# Patient Record
Sex: MALE | Race: BLACK OR AFRICAN AMERICAN | Employment: FULL TIME | URBAN - METROPOLITAN AREA
[De-identification: names, ages, dates, MRNs, and addresses within clinical notes are randomized per-mention and may not be internally consistent; named-entity substitution may affect disease eponyms.]

---

## 2022-12-11 ENCOUNTER — APPOINTMENT (OUTPATIENT)
Dept: GENERAL RADIOLOGY | Age: 42
End: 2022-12-11
Payer: COMMERCIAL

## 2022-12-11 ENCOUNTER — HOSPITAL ENCOUNTER (EMERGENCY)
Age: 42
Discharge: HOME | End: 2022-12-11
Attending: EMERGENCY MEDICINE
Payer: COMMERCIAL

## 2022-12-11 VITALS
DIASTOLIC BLOOD PRESSURE: 108 MMHG | OXYGEN SATURATION: 98 % | WEIGHT: 250 LBS | TEMPERATURE: 97.5 F | HEIGHT: 74 IN | SYSTOLIC BLOOD PRESSURE: 156 MMHG | HEART RATE: 107 BPM | RESPIRATION RATE: 11 BRPM | BODY MASS INDEX: 32.08 KG/M2

## 2022-12-11 DIAGNOSIS — I47.1 PAROXYSMAL SUPRAVENTRICULAR TACHYCARDIA (HCC): Primary | ICD-10-CM

## 2022-12-11 LAB
ABSOLUTE EOS #: 0.09 K/UL (ref 0–0.44)
ABSOLUTE IMMATURE GRANULOCYTE: 0.05 K/UL (ref 0–0.3)
ABSOLUTE LYMPH #: 3.98 K/UL (ref 1.1–3.7)
ABSOLUTE MONO #: 0.95 K/UL (ref 0.1–1.2)
ANION GAP SERPL CALCULATED.3IONS-SCNC: 11 MMOL/L (ref 9–17)
BASOPHILS # BLD: 1 % (ref 0–2)
BASOPHILS ABSOLUTE: 0.05 K/UL (ref 0–0.2)
BUN BLDV-MCNC: 11 MG/DL (ref 6–20)
BUN/CREAT BLD: 12 (ref 9–20)
CALCIUM SERPL-MCNC: 9 MG/DL (ref 8.6–10.4)
CHLORIDE BLD-SCNC: 99 MMOL/L (ref 98–107)
CO2: 27 MMOL/L (ref 20–31)
CREAT SERPL-MCNC: 0.89 MG/DL (ref 0.7–1.2)
D-DIMER QUANTITATIVE: <0.27 MG/L FEU (ref 0–0.59)
EOSINOPHILS RELATIVE PERCENT: 1 % (ref 1–4)
GFR SERPL CREATININE-BSD FRML MDRD: >60 ML/MIN/1.73M2
GLUCOSE BLD-MCNC: 173 MG/DL (ref 70–99)
HCT VFR BLD CALC: 42.8 % (ref 40.7–50.3)
HEMOGLOBIN: 13.5 G/DL (ref 13–17)
IMMATURE GRANULOCYTES: 1 %
LYMPHOCYTES # BLD: 43 % (ref 24–43)
MAGNESIUM: 1.7 MG/DL (ref 1.6–2.6)
MCH RBC QN AUTO: 26.7 PG (ref 25.2–33.5)
MCHC RBC AUTO-ENTMCNC: 31.5 G/DL (ref 28.4–34.8)
MCV RBC AUTO: 84.8 FL (ref 82.6–102.9)
MONOCYTES # BLD: 10 % (ref 3–12)
NRBC AUTOMATED: 0 PER 100 WBC
PDW BLD-RTO: 13.4 % (ref 11.8–14.4)
PLATELET # BLD: 326 K/UL (ref 138–453)
PMV BLD AUTO: 10.5 FL (ref 8.1–13.5)
POTASSIUM SERPL-SCNC: 4.1 MMOL/L (ref 3.7–5.3)
PRO-BNP: 109 PG/ML
RBC # BLD: 5.05 M/UL (ref 4.21–5.77)
SEG NEUTROPHILS: 44 % (ref 36–65)
SEGMENTED NEUTROPHILS ABSOLUTE COUNT: 3.98 K/UL (ref 1.5–8.1)
SODIUM BLD-SCNC: 137 MMOL/L (ref 135–144)
TROPONIN, HIGH SENSITIVITY: 14 NG/L (ref 0–22)
TROPONIN, HIGH SENSITIVITY: 28 NG/L (ref 0–22)
TSH SERPL DL<=0.05 MIU/L-ACNC: 1.35 UIU/ML (ref 0.3–5)
WBC # BLD: 9.1 K/UL (ref 3.5–11.3)

## 2022-12-11 PROCEDURE — 85025 COMPLETE CBC W/AUTO DIFF WBC: CPT

## 2022-12-11 PROCEDURE — 96361 HYDRATE IV INFUSION ADD-ON: CPT

## 2022-12-11 PROCEDURE — 93005 ELECTROCARDIOGRAM TRACING: CPT | Performed by: NURSE PRACTITIONER

## 2022-12-11 PROCEDURE — 6360000002 HC RX W HCPCS: Performed by: NURSE PRACTITIONER

## 2022-12-11 PROCEDURE — 84443 ASSAY THYROID STIM HORMONE: CPT

## 2022-12-11 PROCEDURE — 71045 X-RAY EXAM CHEST 1 VIEW: CPT

## 2022-12-11 PROCEDURE — 2580000003 HC RX 258: Performed by: NURSE PRACTITIONER

## 2022-12-11 PROCEDURE — 83880 ASSAY OF NATRIURETIC PEPTIDE: CPT

## 2022-12-11 PROCEDURE — 2500000003 HC RX 250 WO HCPCS: Performed by: EMERGENCY MEDICINE

## 2022-12-11 PROCEDURE — 6370000000 HC RX 637 (ALT 250 FOR IP): Performed by: NURSE PRACTITIONER

## 2022-12-11 PROCEDURE — 83735 ASSAY OF MAGNESIUM: CPT

## 2022-12-11 PROCEDURE — 99285 EMERGENCY DEPT VISIT HI MDM: CPT

## 2022-12-11 PROCEDURE — 84484 ASSAY OF TROPONIN QUANT: CPT

## 2022-12-11 PROCEDURE — 36415 COLL VENOUS BLD VENIPUNCTURE: CPT

## 2022-12-11 PROCEDURE — 96375 TX/PRO/DX INJ NEW DRUG ADDON: CPT

## 2022-12-11 PROCEDURE — 85379 FIBRIN DEGRADATION QUANT: CPT

## 2022-12-11 PROCEDURE — 80048 BASIC METABOLIC PNL TOTAL CA: CPT

## 2022-12-11 PROCEDURE — 96374 THER/PROPH/DIAG INJ IV PUSH: CPT

## 2022-12-11 RX ORDER — LANOLIN ALCOHOL/MO/W.PET/CERES
400 CREAM (GRAM) TOPICAL ONCE
Status: COMPLETED | OUTPATIENT
Start: 2022-12-11 | End: 2022-12-11

## 2022-12-11 RX ORDER — ADENOSINE 3 MG/ML
12 INJECTION, SOLUTION INTRAVENOUS ONCE
Status: COMPLETED | OUTPATIENT
Start: 2022-12-11 | End: 2022-12-11

## 2022-12-11 RX ORDER — 0.9 % SODIUM CHLORIDE 0.9 %
1000 INTRAVENOUS SOLUTION INTRAVENOUS ONCE
Status: COMPLETED | OUTPATIENT
Start: 2022-12-11 | End: 2022-12-11

## 2022-12-11 RX ORDER — ADENOSINE 3 MG/ML
6 INJECTION, SOLUTION INTRAVENOUS ONCE
Status: COMPLETED | OUTPATIENT
Start: 2022-12-11 | End: 2022-12-11

## 2022-12-11 RX ORDER — METOPROLOL TARTRATE 5 MG/5ML
5 INJECTION INTRAVENOUS ONCE
Status: COMPLETED | OUTPATIENT
Start: 2022-12-11 | End: 2022-12-11

## 2022-12-11 RX ORDER — ADENOSINE 3 MG/ML
12 INJECTION, SOLUTION INTRAVENOUS ONCE
Status: DISCONTINUED | OUTPATIENT
Start: 2022-12-11 | End: 2022-12-11

## 2022-12-11 RX ORDER — METOPROLOL SUCCINATE 50 MG/1
50 TABLET, EXTENDED RELEASE ORAL ONCE
Status: COMPLETED | OUTPATIENT
Start: 2022-12-11 | End: 2022-12-11

## 2022-12-11 RX ORDER — METOPROLOL SUCCINATE 50 MG/1
50 TABLET, EXTENDED RELEASE ORAL DAILY
Qty: 30 TABLET | Refills: 0 | Status: SHIPPED | OUTPATIENT
Start: 2022-12-11 | End: 2023-01-10

## 2022-12-11 RX ADMIN — Medication 400 MG: at 18:40

## 2022-12-11 RX ADMIN — METOPROLOL TARTRATE 5 MG: 5 INJECTION, SOLUTION INTRAVENOUS at 16:33

## 2022-12-11 RX ADMIN — METOPROLOL SUCCINATE 50 MG: 50 TABLET, EXTENDED RELEASE ORAL at 17:57

## 2022-12-11 RX ADMIN — ADENOSINE 6 MG: 3 INJECTION, SOLUTION INTRAVENOUS at 15:46

## 2022-12-11 RX ADMIN — SODIUM CHLORIDE 1000 ML: 9 INJECTION, SOLUTION INTRAVENOUS at 15:41

## 2022-12-11 RX ADMIN — ADENOSINE 12 MG: 3 INJECTION, SOLUTION INTRAVENOUS at 15:49

## 2022-12-11 ASSESSMENT — ENCOUNTER SYMPTOMS
SHORTNESS OF BREATH: 0
NAUSEA: 0
VOMITING: 0
COUGH: 0
ABDOMINAL PAIN: 0

## 2022-12-11 ASSESSMENT — VISUAL ACUITY: OU: 1

## 2022-12-11 ASSESSMENT — PAIN - FUNCTIONAL ASSESSMENT: PAIN_FUNCTIONAL_ASSESSMENT: NONE - DENIES PAIN

## 2022-12-11 NOTE — ED PROVIDER NOTES
EMERGENCY DEPARTMENT ENCOUNTER   ATTENDING ATTESTATION     Pt Name: Rosalia Hoang  MRN: [id number]  [place] 1980  Date of evaluation: 12/11/22   Rosalia Haong is a 40 y.o. male with CC: Palpitations      MDM: 50-year-old male with only history of hypertension on lisinopril presents emergency department with heart palpitations, shortness of breath, unwell feeling that is been going on since this morning. Patient has never had any issues with cardiac arrhythmias in the past.  Initial evaluation patient's heart rate is in the 180s to 190s range and clearly SVT on EKG. Placed on Nipendo, monitor. Initial dose of adenosine 6 mg did not work, this was repeated at 12 mg which did convert him to sinus tachycardia with his heart rate in the 120s to 130s. Labs sent. Chest x-ray. D-dimer. He does have a iodine allergy so if he needs any rule out PE studies he will require a VQ scan. Given a dose of IV Lopressor due to persistent sinus tachycardia with hypertension. IV fluids infusing. We will ensure that electrolytes and thyroid are within normal limits. Goal potassium greater than 4 magnesium greater than 2. Plan for admission to hospitalist with cardiology consult. CRITICAL CARE: There was significant risk of life threatening deterioration of patient's condition requiring my direct management. Critical care time 32 minutes, excluding any documented procedures. EKG:  All EKG's are interpreted by the Emergency Department Physician who either signs or Co-signs this chart in the absence of a cardiologist.    EKG Interpretation    Interpreted by emergency department physician    Rhythm: paroxismal supraventricular tachycardia  Rate: >160  Axis: normal  Ectopy: none  Conduction: normal  ST Segments: no acute change  T Waves: no acute change  Q Waves: none    Clinical Impression: non-specific EKG    Cesar Lizama DO    EKG Interpretation    Interpreted by emergency department physician    Rhythm: sinus tachycardia  Rate: 130-140  Axis: normal  Ectopy: none  Conduction: normal  ST Segments: no acute change  T Waves: no acute change  Q Waves: none    Clinical Impression: non-specific EKG    Peggy Ruiz DO      RADIOLOGY:All plain film, CT, MRI, and formal ultrasound images (except ED bedside ultrasound) are read by the radiologist, see reports below, unless otherwise noted in MDM or here. XR CHEST PORTABLE   Final Result      No acute intrathoracic pathology. LABS: All lab results were reviewed by myself, and all abnormals are listed below. Labs Reviewed   CBC WITH AUTO DIFFERENTIAL - Abnormal; Notable for the following components:       Result Value    Immature Granulocytes 1 (*)     Absolute Lymph # 3.98 (*)     All other components within normal limits   BASIC METABOLIC PANEL   D-DIMER, QUANTITATIVE   MAGNESIUM   TROPONIN   TROPONIN   BRAIN NATRIURETIC PEPTIDE   TSH WITH REFLEX       CONSULTS:  None    FINAL IMPRESSION      1. Paroxysmal supraventricular tachycardia ([address]          PASTMEDICAL HISTORY     Past Medical History:   Diagnosis Date    Asthma        SURGICAL HISTORY       Past Surgical History:   Procedure Laterality Date    APPENDECTOMY  10/02/2017    open, drainage of abd abscess    APPENDECTOMY Right 10/2/2017    DIAGNOSTIC LAPAROSCOPY, EXPLORATORY  LAPAROTOMY, OPEN APPENDECTOMY, DRAINAGE OF ABDOMINAL ABSCESS performed by Barbara Rodriguez MD at [facility]       Previous Medications    COZAAR 25 MG TABLET    Take 1 tablet by mouth daily. DICYCLOMINE (BENTYL) 10 MG CAPSULE    Take 1 capsule by mouth every 6 hours as needed (cramps)    FLUTICASONE (FLONASE) 50 MCG/ACT NASAL SPRAY    1 spray by Nasal route daily. JANUVIA 50 MG TABLET    Take 1 tablet by mouth daily.     ONDANSETRON (ZOFRAN ODT) 4 MG DISINTEGRATING TABLET    Take 1 tablet by mouth every 8 hours as needed for Nausea    ONDANSETRON (ZOFRAN) 4 MG TABLET    Take 1 tablet by mouth every 8 hours as needed for Nausea or Vomiting    VITAMIN D (ERGOCALCIFEROL) 51596 UNITS CAPS CAPSULE    Take 1 capsule by mouth once a week. ALLERGIES     is allergic to iodides and iodine. FAMILY HISTORY     has no family status information on file. SOCIAL HISTORY       Social History     Tobacco Use    Smoking status: Never    Smokeless tobacco: Never   Substance Use Topics    Alcohol use: Yes     Comment: socially    Drug use: No       This visit was performed by both a physician and an APC. I personally evaluated and examined the patient.  I performed all aspects of the MDM as documented     Darin Sutton DO  Attending Emergency Physician          Gina Ross DO  12/11/22 [id number]

## 2022-12-11 NOTE — DISCHARGE INSTRUCTIONS
Take medication as prescribed. Follow-up with cardiologist provided as soon as possible. Also follow-up with your primary care provider. Return to emergency department symptoms worsen.

## 2022-12-11 NOTE — ED PROVIDER NOTES
[address] [place] [address] ED  EMERGENCY DEPARTMENT ENCOUNTER      Pt Name: Danna Moralez  MRN: [id number]  [place] 1980  Date of evaluation: 12/11/2022  Provider: Tirso Kerr       Chief Complaint   Patient presents with    Palpitations         HISTORY OFPRESENT ILLNESS  (Location/Symptom, Timing/Onset, Context/Setting, Quality, Duration, Modifying Factors, Severity.)   Danna Moralez is a 40 y.o. male who presents to the emergency department by private auto for evaluation of palpitations that started about 1 hour prior to arrival.  Patient states he was eating lunch at [facility] when he started not to feel right. He denies chest pain, shortness of breath, lightheadedness, nausea, vomiting, abdominal pain, fever or chills. No history of CAD. Does have history of diabetes. Denies history of hypertension but states his PCP put him on lisinopril to help protect his kidneys for his diabetes. Does have history of asthma. Nursing Notes were reviewed.     PASTMEDICAL HISTORY     Past Medical History:   Diagnosis Date    Asthma          SURGICAL HISTORY       Past Surgical History:   Procedure Laterality Date    APPENDECTOMY  10/02/2017    open, drainage of abd abscess    APPENDECTOMY Right 10/2/2017    DIAGNOSTIC LAPAROSCOPY, EXPLORATORY  LAPAROTOMY, OPEN APPENDECTOMY, DRAINAGE OF ABDOMINAL ABSCESS performed by Harvey Hernandes MD at [facility]     Discharge Medication List as of 12/11/2022  6:51 PM        CONTINUE these medications which have NOT CHANGED    Details   ondansetron (ZOFRAN ODT) 4 MG disintegrating tablet Take 1 tablet by mouth every 8 hours as needed for Nausea, Disp-20 tablet, R-0Print      dicyclomine (BENTYL) 10 MG capsule Take 1 capsule by mouth every 6 hours as needed (cramps), Disp-20 capsule, R-0Print      ondansetron (ZOFRAN) 4 MG tablet Take 1 tablet by mouth every 8 hours as needed for Nausea or Vomiting, Disp-20 tablet, R-0Print      Alger Delon 50 MG tablet Take 1 tablet by mouth daily. , Disp-30 tablet, R-3, DAWNO PRINT      vitamin D (ERGOCALCIFEROL) 56480 UNITS CAPS capsule Take 1 capsule by mouth once a week., Disp-12 capsule, R-1Normal      COZAAR 25 MG tablet Take 1 tablet by mouth daily. , Disp-30 tablet, R-3, DAWNO PRINT      fluticasone (FLONASE) 50 MCG/ACT nasal spray 1 spray by Nasal route daily. , Disp-1 Bottle, R-3             ALLERGIES     Iodides and Iodine    FAMILY HISTORY     No family history on file. SOCIAL HISTORY       Social History     Socioeconomic History    Marital status: Single     Spouse name: None    Number of children: None    Years of education: None    Highest education level: None   Tobacco Use    Smoking status: Never    Smokeless tobacco: Never   Substance and Sexual Activity    Alcohol use: Yes     Comment: socially    Drug use: No   Social History Narrative    ** Merged History Encounter **              REVIEW OF SYSTEMS    (2-9 systems for level 4, 10 or more for level 5)     Review of Systems   Constitutional:  Negative for fever. Respiratory:  Negative for cough and shortness of breath. Cardiovascular:  Positive for palpitations. Negative for chest pain. Gastrointestinal:  Negative for abdominal pain, nausea and vomiting. Neurological:  Negative for dizziness, light-headedness and headaches. All other systems reviewed and are negative. Except as noted above the remainder of the review of systems was reviewed and negative. PHYSICAL EXAM    (up to 7 for level 4, 8 or more for level 5)     ED Triage Vitals [12/11/22 1522]   BP Temp Temp Source Heart Rate Resp SpO2 Height Weight   135/72 97.5 °F (36.4 °C) Oral (!) 192 16 99 % 6' 2\" (1.88 m) 250 lb (113.4 kg)       Physical Exam  Constitutional:       Appearance: Normal appearance. He is well-developed, well-groomed and normal weight. HENT:      Head: Normocephalic.       Right Ear: External ear normal.      Left Ear: External ear normal.      Nose: Nose normal.   Eyes:      General: Lids are normal. Vision grossly intact. Extraocular Movements: Extraocular movements intact. Conjunctiva/sclera: Conjunctivae normal.   Cardiovascular:      Rate and Rhythm: Regular rhythm. Tachycardia present. Heart sounds: Normal heart sounds, S1 normal and S2 normal.   Pulmonary:      Effort: Pulmonary effort is normal.      Breath sounds: Normal breath sounds. Musculoskeletal:         General: Normal range of motion. Cervical back: Normal range of motion and neck supple. Right lower leg: No edema. Left lower leg: No edema. Skin:     General: Skin is warm and dry. Neurological:      Mental Status: He is alert and oriented to person, place, and time. DIAGNOSTIC RESULTS     EKG:All EKG's are interpreted by the Emergency Department Physician who either signs or Co-signs this chart in the absence of a cardiologist.    EKG interpreted by ER physician    RADIOLOGY:   Non-plain film images such as CT, Ultrasound and MRI are read by theradiologist. Plain radiographic images are visualized and preliminarily interpreted by the emergency physician with the below findings:    XR CHEST PORTABLE    Result Date: 12/11/2022  EXAMINATION: ONE XRAY VIEW OF THE CHEST 12/11/2022 3:57 pm COMPARISON: None. HISTORY: ORDERING SYSTEM PROVIDED HISTORY: Tachycardia TECHNOLOGIST PROVIDED HISTORY: Tachycardia Reason for Exam: tachycardia FINDINGS: Chest radiograph: The cardiomediastinal silhouette and hilar contours are normal. The lungs are clear with no focal consolidation, pleural effusion or pneumothorax. The overlying soft tissue and osseous structures do not demonstrate acute abnormality. No acute intrathoracic pathology. Interpretation per the Radiologist below, if available at the time of this note:    XR CHEST PORTABLE   Final Result      No acute intrathoracic pathology.                EDBEDSIDE ULTRASOUND:   Performed by Suzette Thompson none    LABS:  Labs Reviewed   BASIC METABOLIC PANEL - Abnormal; Notable for the following components:       Result Value    Glucose 173 (*)     All other components within normal limits   CBC WITH AUTO DIFFERENTIAL - Abnormal; Notable for the following components:    Immature Granulocytes 1 (*)     Absolute Lymph # 3.98 (*)     All other components within normal limits   TROPONIN - Abnormal; Notable for the following components:    Troponin, High Sensitivity 28 (*)     All other components within normal limits   D-DIMER, QUANTITATIVE   MAGNESIUM   TROPONIN   BRAIN NATRIURETIC PEPTIDE   TSH WITH REFLEX       All other labs were within normal range or not returned as of this dictation. EMERGENCY DEPARTMENT COURSE andDIFFERENTIAL DIAGNOSIS/MDM:   Patient evaluated conjunction with ER physician. Patient presents with complaint of palpitations that started about an hour prior to arrival.  Initial EKG showed SVT with a rate of 182. Dr. Tristin Arreola at bedside. Patient on cardiac monitor. It was discussed the patient that he will be receiving adenosine to help slow his heart rate down. Patient was initially given 6 mg of adenosine with no improvement of his heart rate. He was given an additional 12 mg of adenosine and his heart rate improved to 120s. Repeat EKG shows sinus tachycardia. Cardiology consult-I discussed case with Dr. Ocie Lennox. Patient does not have active chest pain in the ED. Okay to discharge patient home on Toprol 50 mg XL and can follow-up in the office. Patient was given dose of Toprol in the ED. Discussed test results and follow-up care as well as return precautions the patient. He will follow-up with cardiology within the next 2 days. CRITICAL CARE TIME     Due to the high probability of sudden and clinically significant deterioration in the patient's condition he required highest level of my preparedness to intervene urgently.  I provided critical care time including documentation time, medication orders and management, reevaluation, vital sign assessment, ordering and reviewing of of lab tests ordering and reviewing of x-ray studies, and admission orders. Aggregate critical care time is between 30-35 minutes including only time during which I was engaged in work directly related to his care and did not include time spent treating other patients simultaneously. Vitals:    Vitals:    12/11/22 1716 12/11/22 1731 12/11/22 1746 12/11/22 1801   BP: (!) 149/100 (!) 151/97 (!) 143/97 (!) 156/108   Pulse: 99 (!) 108 (!) 104 (!) 107   Resp: (!) 0 19 24 11   Temp:       TempSrc:       SpO2: 99% 99% 98% 98%   Weight:       Height:             CONSULTS:  IP CONSULT TO CARDIOLOGY    RES:  Procedures    FINAL IMPRESSION      1.  Paroxysmal supraventricular tachycardia [facility])          DISPOSITION/PLAN   DISPOSITION Decision To Discharge 12/11/2022 06:17:50 PM      PATIENT REFERRED TO:   Alexia Yanes, [address] [facility] [phone]    Schedule an appointment as soon as possible for a visit       Yanira Tao  [address]  [phone]    Schedule an appointment as soon as possible for a visit       [facility] ED  [address] [facility]  [phone]    If symptoms worsen    DISCHARGE MEDICATIONS:     Discharge Medication List as of 12/11/2022  6:51 PM        START taking these medications    Details   metoprolol succinate (TOPROL XL) 50 MG extended release tablet Take 1 tablet by mouth daily, Disp-30 tablet, R-0Print           Electronically signed by VA Herrera 12/11/2022 at 10:28 PM           VA Herrera CNP  12/11/22 9160

## 2022-12-11 NOTE — Clinical Note
Argenis Estrella was seen and treated in our emergency department on 12/11/2022. He may return to work on 12/14/2022. If you have any questions or concerns, please don't hesitate to call.       Sukhjinder Menjivar 1721, DO

## 2022-12-12 LAB
EKG ATRIAL RATE: 124 BPM
EKG P AXIS: 53 DEGREES
EKG P-R INTERVAL: 162 MS
EKG Q-T INTERVAL: 314 MS
EKG QRS DURATION: 74 MS
EKG QTC CALCULATION (BAZETT): 451 MS
EKG R AXIS: 34 DEGREES
EKG T AXIS: 47 DEGREES
EKG VENTRICULAR RATE: 124 BPM

## 2024-03-07 ENCOUNTER — HOSPITAL ENCOUNTER (EMERGENCY)
Age: 44
Discharge: HOME | End: 2024-03-07
Attending: EMERGENCY MEDICINE

## 2024-03-07 VITALS
DIASTOLIC BLOOD PRESSURE: 90 MMHG | TEMPERATURE: 98.2 F | SYSTOLIC BLOOD PRESSURE: 149 MMHG | OXYGEN SATURATION: 100 % | RESPIRATION RATE: 18 BRPM | HEIGHT: 74 IN | HEART RATE: 79 BPM | WEIGHT: 245 LBS | BODY MASS INDEX: 31.44 KG/M2

## 2024-03-07 DIAGNOSIS — M54.32 SCIATICA OF LEFT SIDE: Primary | ICD-10-CM

## 2024-03-07 PROCEDURE — 96372 THER/PROPH/DIAG INJ SC/IM: CPT

## 2024-03-07 PROCEDURE — 6370000000 HC RX 637 (ALT 250 FOR IP): Performed by: NURSE PRACTITIONER

## 2024-03-07 PROCEDURE — 6360000002 HC RX W HCPCS: Performed by: NURSE PRACTITIONER

## 2024-03-07 PROCEDURE — 99284 EMERGENCY DEPT VISIT MOD MDM: CPT

## 2024-03-07 RX ORDER — HYDROCODONE BITARTRATE AND ACETAMINOPHEN 5; 325 MG/1; MG/1
1 TABLET ORAL ONCE
Status: COMPLETED | OUTPATIENT
Start: 2024-03-07 | End: 2024-03-07

## 2024-03-07 RX ORDER — ORPHENADRINE CITRATE 30 MG/ML
60 INJECTION INTRAMUSCULAR; INTRAVENOUS ONCE
Status: COMPLETED | OUTPATIENT
Start: 2024-03-07 | End: 2024-03-07

## 2024-03-07 RX ORDER — TIZANIDINE 4 MG/1
4 TABLET ORAL EVERY 8 HOURS PRN
Qty: 15 TABLET | Refills: 0 | Status: SHIPPED | OUTPATIENT
Start: 2024-03-07

## 2024-03-07 RX ORDER — NAPROXEN 500 MG/1
500 TABLET ORAL 2 TIMES DAILY PRN
Qty: 14 TABLET | Refills: 0 | Status: SHIPPED | OUTPATIENT
Start: 2024-03-07

## 2024-03-07 RX ORDER — DEXAMETHASONE SODIUM PHOSPHATE 10 MG/ML
10 INJECTION, SOLUTION INTRAMUSCULAR; INTRAVENOUS ONCE
Status: COMPLETED | OUTPATIENT
Start: 2024-03-07 | End: 2024-03-07

## 2024-03-07 RX ORDER — HYDROMORPHONE HYDROCHLORIDE 1 MG/ML
1 INJECTION, SOLUTION INTRAMUSCULAR; INTRAVENOUS; SUBCUTANEOUS ONCE
Status: COMPLETED | OUTPATIENT
Start: 2024-03-07 | End: 2024-03-07

## 2024-03-07 RX ORDER — MORPHINE SULFATE 10 MG/ML
10 INJECTION, SOLUTION INTRAMUSCULAR; INTRAVENOUS ONCE
Status: DISCONTINUED | OUTPATIENT
Start: 2024-03-07 | End: 2024-03-07

## 2024-03-07 RX ORDER — HYDROCODONE BITARTRATE AND ACETAMINOPHEN 5; 325 MG/1; MG/1
1 TABLET ORAL EVERY 8 HOURS PRN
Qty: 12 TABLET | Refills: 0 | Status: SHIPPED | OUTPATIENT
Start: 2024-03-07 | End: 2024-03-11

## 2024-03-07 RX ADMIN — HYDROCODONE BITARTRATE AND ACETAMINOPHEN 1 TABLET: 5; 325 TABLET ORAL at 13:21

## 2024-03-07 RX ADMIN — DEXAMETHASONE SODIUM PHOSPHATE 10 MG: 10 INJECTION, SOLUTION INTRAMUSCULAR; INTRAVENOUS at 13:20

## 2024-03-07 RX ADMIN — HYDROMORPHONE HYDROCHLORIDE 1 MG: 1 INJECTION, SOLUTION INTRAMUSCULAR; INTRAVENOUS; SUBCUTANEOUS at 14:55

## 2024-03-07 RX ADMIN — ORPHENADRINE CITRATE 60 MG: 60 INJECTION INTRAMUSCULAR; INTRAVENOUS at 13:21

## 2024-03-07 ASSESSMENT — ENCOUNTER SYMPTOMS
SHORTNESS OF BREATH: 0
BACK PAIN: 1
ABDOMINAL PAIN: 0
COLOR CHANGE: 0

## 2024-03-07 ASSESSMENT — PAIN SCALES - GENERAL
PAINLEVEL_OUTOF10: 8
PAINLEVEL_OUTOF10: 10
PAINLEVEL_OUTOF10: 10

## 2024-03-07 ASSESSMENT — PAIN - FUNCTIONAL ASSESSMENT
PAIN_FUNCTIONAL_ASSESSMENT: 0-10
PAIN_FUNCTIONAL_ASSESSMENT: 0-10

## 2024-03-07 NOTE — ED PROVIDER NOTES
eMERGENCY dEPARTMENT eNCOUnter   Independent Attestation     Pt Name: Quoc Marina  MRN: [id number]  Birthdate 1980  Date of evaluation: 3/7/24     Quoc Marina is a 43 y.o. male with CC: Hip Pain and Leg Pain (Left sided.)        This visit was performed by both a physician and an APC. I performed all aspects of the MDM as documented.      Tosin Leone MD  Attending Emergency Physician            Tosin Leone MD  03/07/24 3664    
PM        START taking these medications    Details   naproxen (NAPROSYN) 500 MG tablet Take 1 tablet by mouth 2 times daily as needed for Pain, Disp-14 tablet, R-0Normal      tiZANidine (ZANAFLEX) 4 MG tablet Take 1 tablet by mouth every 8 hours as needed (pain, muscle spasms), Disp-15 tablet, R-0Normal      HYDROcodone-acetaminophen (NORCO) 5-325 MG per tablet Take 1 tablet by mouth every 8 hours as needed for Pain for up to 4 days. Max Daily Amount: 3 tablets, Disp-12 tablet, R-0Normal                 (Please note that portions of this note were completed with a voice recognition program.  Efforts were made to edit the dictations but occasionally words are mis-transcribed.)    VA Leon - Tali Bose APRN - CNP  03/07/24 [id number]

## 2024-03-07 NOTE — DISCHARGE INSTRUCTIONS
Zanaflex and/or norco:  WARNING:  May cause drowsiness.  May impair ability to operate vehicles or machinery.  Do not use in combination with alcohol.